# Patient Record
Sex: MALE | Race: WHITE
[De-identification: names, ages, dates, MRNs, and addresses within clinical notes are randomized per-mention and may not be internally consistent; named-entity substitution may affect disease eponyms.]

---

## 2021-02-07 ENCOUNTER — HOSPITAL ENCOUNTER (EMERGENCY)
Dept: HOSPITAL 46 - ED | Age: 61
Discharge: HOME | End: 2021-02-07
Payer: COMMERCIAL

## 2021-02-07 VITALS — WEIGHT: 218 LBS | BODY MASS INDEX: 26.55 KG/M2 | HEIGHT: 76 IN

## 2021-02-07 DIAGNOSIS — Z79.899: ICD-10-CM
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
## 2021-02-07 DIAGNOSIS — Z88.0: ICD-10-CM

## 2021-02-07 DIAGNOSIS — S39.012A: Primary | ICD-10-CM
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
## 2021-02-12 ENCOUNTER — HOSPITAL ENCOUNTER (EMERGENCY)
Dept: HOSPITAL 46 - ED | Age: 61
Discharge: HOME | End: 2021-02-12
Payer: COMMERCIAL

## 2021-02-12 VITALS — HEIGHT: 76 IN | WEIGHT: 222.01 LBS | BODY MASS INDEX: 27.03 KG/M2

## 2021-02-12 DIAGNOSIS — Z88.0: ICD-10-CM

## 2021-02-12 DIAGNOSIS — I10: ICD-10-CM

## 2021-02-12 DIAGNOSIS — Z79.899: ICD-10-CM

## 2021-02-12 DIAGNOSIS — M54.5: Primary | ICD-10-CM

## 2021-02-12 PROCEDURE — A9270 NON-COVERED ITEM OR SERVICE: HCPCS

## 2021-02-12 NOTE — XMS
PreManage Notification: TIBURCIO SÁNCHEZ MRN:[id number]
 
Security Information
 
Security Events
No recent Security Events currently on file
 
 
 
CRITERIA MET
------------
- [facility] - 2 Visits in 30 Days
 
 
CARE PROVIDERS
There are no care providers on record at this time.
 
Nettie has no Care Guidelines for this patient.
 
[facility] VISIT COUNT (12 MO.)
-------------------------------------------------------------------------------------
1 [facility]
 
2 [facility]
-------------------------------------------------------------------------------------
TOTAL 3
-------------------------------------------------------------------------------------
NOTE: Visits indicate total known visits.
 
ED/C VISIT TRACKING (12 MO.)
-------------------------------------------------------------------------------------
02/12/2021 08:04
[facility] [place]
 
TYPE: Emergency
 
COMPLAINT:
- BACK PAIN
-------------------------------------------------------------------------------------
02/07/2021 17:29
[facility] [place]
 
TYPE: Emergency
 
COMPLAINT:
- BACK PAIN
 
DIAGNOSES:
- Allergy status to penicillin
- Activity, digging, shoveling and raking
- Exposure to other specified factors, initial encounter
- Dorsalgia, unspecified
- Other long term (current) drug therapy
- Strain of muscle, fascia and tendon of lower back, initial encounter
-------------------------------------------------------------------------------------
07/29/2020 09:32
[facility]       [facility]
 
 
TYPE: Emergency
 
DIAGNOSES:
- Essential (primary) hypertension
- Chest Pain
- Other chest pain
-------------------------------------------------------------------------------------
 
 
INPATIENT VISIT TRACKING (12 MO.)
No inpatient visits to display in this time frame
 
[email or web address]

## 2021-06-25 ENCOUNTER — HOSPITAL ENCOUNTER (EMERGENCY)
Dept: HOSPITAL 46 - ED | Age: 61
Discharge: HOME | End: 2021-06-25
Payer: COMMERCIAL

## 2021-06-25 VITALS — WEIGHT: 217.99 LBS | HEIGHT: 77 IN | BODY MASS INDEX: 25.74 KG/M2

## 2021-06-25 DIAGNOSIS — S61.213A: Primary | ICD-10-CM

## 2021-06-25 DIAGNOSIS — Z79.899: ICD-10-CM
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
## 2021-06-25 DIAGNOSIS — I10: ICD-10-CM

## 2021-06-25 DIAGNOSIS — Z88.0: ICD-10-CM

## 2021-06-25 DIAGNOSIS — Z23: ICD-10-CM
